# Patient Record
Sex: MALE | Race: WHITE | NOT HISPANIC OR LATINO | URBAN - METROPOLITAN AREA
[De-identification: names, ages, dates, MRNs, and addresses within clinical notes are randomized per-mention and may not be internally consistent; named-entity substitution may affect disease eponyms.]

---

## 2022-04-21 ENCOUNTER — CLAIMS CREATED FROM THE CLAIM WINDOW (OUTPATIENT)
Dept: URBAN - METROPOLITAN AREA CLINIC 70 | Facility: CLINIC | Age: 87
End: 2022-04-21
Payer: SELF-PAY

## 2022-04-21 ENCOUNTER — WEB ENCOUNTER (OUTPATIENT)
Dept: URBAN - METROPOLITAN AREA CLINIC 70 | Facility: CLINIC | Age: 87
End: 2022-04-21

## 2022-04-21 ENCOUNTER — LAB OUTSIDE AN ENCOUNTER (OUTPATIENT)
Dept: URBAN - METROPOLITAN AREA CLINIC 70 | Facility: CLINIC | Age: 87
End: 2022-04-21

## 2022-04-21 ENCOUNTER — DASHBOARD ENCOUNTERS (OUTPATIENT)
Age: 87
End: 2022-04-21

## 2022-04-21 VITALS
WEIGHT: 187.7 LBS | HEIGHT: 68 IN | DIASTOLIC BLOOD PRESSURE: 51 MMHG | SYSTOLIC BLOOD PRESSURE: 118 MMHG | HEART RATE: 75 BPM | BODY MASS INDEX: 28.45 KG/M2 | TEMPERATURE: 97.5 F

## 2022-04-21 DIAGNOSIS — R79.89 ELEVATED LFTS: ICD-10-CM

## 2022-04-21 PROCEDURE — 99203 OFFICE O/P NEW LOW 30 MIN: CPT | Performed by: NURSE PRACTITIONER

## 2022-04-21 RX ORDER — TORSEMIDE 100 MG/1
1 TABLET TABLET ORAL ONCE A DAY
Status: ACTIVE | COMMUNITY

## 2022-04-21 RX ORDER — SPIRONOLACTONE 25 MG/1
1 TABLET TABLET, FILM COATED ORAL
Status: ACTIVE | COMMUNITY

## 2022-04-21 RX ORDER — DIGOXIN 125 UG/1
1 TABLET TABLET ORAL
Status: ACTIVE | COMMUNITY

## 2022-04-21 NOTE — HPI-TODAY'S VISIT:
Patient is a 88 y/o male from [facility] who presents today accompanied by daughter to assist with obtaining history due to non-english speaking for evaluation of cirrhosis. He was recently hospitalized at [facility] last month for pneumonia (currently on O2 at 2L via [place]) with also /fluid overload/CHF exacerbation. While hospitalized he underwent a CTA of chest to r/o PE with findings of cirrhosis of liver but no PE (on Eliquis for A-fib). There is no prior hx of liver disease. Mother possibly had liver disease but is unsure. No alcohol use. No hx of IVDU. Labs by PCP on 4/4/22 shows LFTs elevated with T.malini 1.7, AST 40, ALT 31, and alk phos 106. Daughter reports he has lost weight recently after he was given diuretics to decrease edema but has now gained back some weight. Denies abdominal pain, N/V, jaundice, constipation, diarrhea, or signs of GI bleeding.

## 2022-06-02 ENCOUNTER — OFFICE VISIT (OUTPATIENT)
Dept: URBAN - METROPOLITAN AREA CLINIC 70 | Facility: CLINIC | Age: 87
End: 2022-06-02